# Patient Record
Sex: FEMALE | Race: WHITE | NOT HISPANIC OR LATINO | Employment: FULL TIME | URBAN - METROPOLITAN AREA
[De-identification: names, ages, dates, MRNs, and addresses within clinical notes are randomized per-mention and may not be internally consistent; named-entity substitution may affect disease eponyms.]

---

## 2023-09-08 ENCOUNTER — HOSPITAL ENCOUNTER (OUTPATIENT)
Dept: DATA CONVERSION | Facility: HOSPITAL | Age: 33
Discharge: HOME | End: 2023-09-08
Payer: COMMERCIAL

## 2023-09-08 DIAGNOSIS — L70.8 OTHER ACNE: ICD-10-CM

## 2023-09-08 LAB — POTASSIUM SERPL-SCNC: 4.7 MMOL/L (ref 3.4–5.1)

## 2023-10-17 ENCOUNTER — HOSPITAL ENCOUNTER (OUTPATIENT)
Dept: RADIOLOGY | Facility: HOSPITAL | Age: 33
Discharge: HOME | End: 2023-10-17
Payer: COMMERCIAL

## 2023-10-17 DIAGNOSIS — D39.10 NEOPLASM OF UNCERTAIN BEHAVIOR OF UNSPECIFIED OVARY: ICD-10-CM

## 2023-10-17 DIAGNOSIS — N83.2 CYST OF OVARY: ICD-10-CM

## 2023-10-17 DIAGNOSIS — N83.209 CYST OF OVARY: ICD-10-CM

## 2023-10-17 PROBLEM — R79.89 LOW VITAMIN D LEVEL: Status: ACTIVE | Noted: 2023-10-17

## 2023-10-17 PROBLEM — Z15.89 MONOALLELIC MUTATION OF CHEK2 GENE IN FEMALE PATIENT: Status: ACTIVE | Noted: 2023-10-17

## 2023-10-17 PROBLEM — Z15.02 MONOALLELIC MUTATION OF CHEK2 GENE IN FEMALE PATIENT: Status: ACTIVE | Noted: 2023-10-17

## 2023-10-17 PROBLEM — H69.92 DYSFUNCTION OF LEFT EUSTACHIAN TUBE: Status: ACTIVE | Noted: 2023-10-17

## 2023-10-17 PROBLEM — R85.610: Status: ACTIVE | Noted: 2023-10-17

## 2023-10-17 PROBLEM — Z15.09 MONOALLELIC MUTATION OF CHEK2 GENE IN FEMALE PATIENT: Status: ACTIVE | Noted: 2023-10-17

## 2023-10-17 PROBLEM — C56.9 MALIGNANT NEOPLASM OF OVARY (MULTI): Status: ACTIVE | Noted: 2023-10-17

## 2023-10-17 PROBLEM — Z15.01 MONOALLELIC MUTATION OF CHEK2 GENE IN FEMALE PATIENT: Status: ACTIVE | Noted: 2023-10-17

## 2023-10-17 PROCEDURE — 76830 TRANSVAGINAL US NON-OB: CPT

## 2023-10-17 PROCEDURE — 76830 TRANSVAGINAL US NON-OB: CPT | Performed by: RADIOLOGY

## 2023-10-17 PROCEDURE — 76856 US EXAM PELVIC COMPLETE: CPT | Performed by: RADIOLOGY

## 2023-10-17 PROCEDURE — 76856 US EXAM PELVIC COMPLETE: CPT

## 2023-10-17 RX ORDER — SPIRONOLACTONE 50 MG/1
50 TABLET, FILM COATED ORAL DAILY
COMMUNITY

## 2023-10-18 ENCOUNTER — APPOINTMENT (OUTPATIENT)
Dept: RADIOLOGY | Facility: HOSPITAL | Age: 33
End: 2023-10-18
Payer: COMMERCIAL

## 2023-10-19 ENCOUNTER — OFFICE VISIT (OUTPATIENT)
Dept: GYNECOLOGIC ONCOLOGY | Facility: CLINIC | Age: 33
End: 2023-10-19
Payer: COMMERCIAL

## 2023-10-19 VITALS
WEIGHT: 189.82 LBS | BODY MASS INDEX: 28.11 KG/M2 | DIASTOLIC BLOOD PRESSURE: 67 MMHG | SYSTOLIC BLOOD PRESSURE: 121 MMHG | HEART RATE: 71 BPM | HEIGHT: 69 IN | RESPIRATION RATE: 16 BRPM

## 2023-10-19 DIAGNOSIS — Z87.42 HX OF OVARIAN CYST: ICD-10-CM

## 2023-10-19 DIAGNOSIS — C56.9 MALIGNANT NEOPLASM OF OVARY, UNSPECIFIED LATERALITY (MULTI): Primary | ICD-10-CM

## 2023-10-19 PROCEDURE — 99214 OFFICE O/P EST MOD 30 MIN: CPT | Performed by: NURSE PRACTITIONER

## 2023-10-19 ASSESSMENT — PAIN SCALES - GENERAL: PAINLEVEL: 0-NO PAIN

## 2023-10-19 NOTE — PROGRESS NOTES
Patient ID: Lissett Jacobsen is a 32 y.o. female.  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  Same mutation found in her maternal grandmother with endometrial cancer.  Mutation felt to increase risk of breast and colon cancer by approximately twice normal rate.  Unsure if this mutation led to an increased risk of the borderline mucinous neoplasm.      OBGynHx: G0, has never had a pap smear before. States she's had regular monthly menses.   PMHx: Denies  PSHx: Tonsillectomy (age 5)  FMHx: GM with uterine cancer in her 50s, Uncle recently diagnosed with possible peritoneal cancer  Meds: Denies  All: NKDA  SH: Denies T/E/D      Family History:   Cancer: yes  GM with uterine cancer (50s), uncle with peritoneal cancer     Last pap 9/2021 was negative    Objective    Visit Vitals  /67 (BP Location: Left arm, Patient Position: Sitting, BP Cuff Size: Adult)   Pulse 71   Resp 16        Patient is a 32 year old female with CHEK-2 mutation s/p exploratory laparotomy, left salpingo-oophorectomy, left pelvic & periaortic lymph node dissection, omentectomy and appendectomy on 3/14/18 for stage 1A mucinous borderline left ovary tumor with microinvasion. Pelvic ultrasound showed resolved ovarian cyst but enlarging endometrial polyp.  Now s/p D&C 11/2022 for endometrial polyp.      Had repeat ultrasound to look at ovarian cyst.  Impression US 10/17/23:  Echogenic lesion with vascular stalk in the upper portion of the  endometrial canal suggestive of polyp. This is similar in appearance  when compared to prior exam.      Right ovary unremarkable.      Left ovary surgically absent.    Interval:  Patient not been sexually active, not using anything for birth control.  States her periods have been normal since her D&C, no intermenstrual  bleeding.  Denies any bowel or bladder issues.          Physical Exam:    Constitutional: Doing well. SUSANNA  Eyes: PERRL  ENMT: Moist mucus membranes  Head/Neck: Supple. Symmetrical  Cardiovascular: Regular, rate and rhythm. 2+ equal pulses of the extremities  Respiratory/Thorax: CTA. RRR. Chest rise symmetrical.  Gastrointestinal: Non-distended, soft, non-tender  Genitourinary: Cervical os visualized with no lesions, no CMT, small mobile uterus, no adnexal masses noted. Smooth vaginal walls.  Vulva with no lesions noted. Smooth rectovaginal septum.   Musculoskeletal: ROM intact, no joint swelling, normal strength  Extremities: No edema  Neurological: Alert and oriented x 3. Pleasant and cooperative.  Lymphatic: No lymphadenopathy. No lymphedema  Psychological: Appropriate mood and behavior  Skin: Warm and dry, no lesions, no rashes    A complete review of systems was performed and all systems were normal except what is noted in the interval history.      Assessment/Plan  Patient is a 32 year old female s/p exploratory laparotomy, left salpingo-oophorectomy, left pelvic & periaortic lymph node dissection, omentectomy and appendectomy on 3/14/18 for stage 1A mucinous borderline left ovary tumor with microinvasion. Pelvic ultrasound showed resolved ovarian cyst but enlarging endometrial polyp.  Now s/p D&C 11/2022 for endometrial polyp. SUSANNA 5 years, resolved ovarian cyst.       PLAN:    Pelvic ultrasound in 1 year or as needed  F/U in 1 year or as needed

## 2024-09-03 ENCOUNTER — TELEPHONE (OUTPATIENT)
Dept: GYNECOLOGIC ONCOLOGY | Facility: HOSPITAL | Age: 34
End: 2024-09-03
Payer: COMMERCIAL

## 2024-09-03 NOTE — TELEPHONE ENCOUNTER
Patient called requesting pelvic ultrasound order in preparation for appointment with Lissett Palencia CNP in October.   Phoned patient to notify that ultrasound order has been entered and radiology scheduling will be able to view the order when she calls to schedule.   Patient verbalized her understanding of information given.

## 2024-09-18 DIAGNOSIS — Z87.42 HX OF OVARIAN CYST: ICD-10-CM

## 2024-09-18 DIAGNOSIS — C56.9 MALIGNANT NEOPLASM OF OVARY, UNSPECIFIED LATERALITY (MULTI): Primary | ICD-10-CM

## 2024-09-19 ENCOUNTER — HOSPITAL ENCOUNTER (OUTPATIENT)
Dept: RADIOLOGY | Facility: HOSPITAL | Age: 34
Discharge: HOME | End: 2024-09-19
Payer: COMMERCIAL

## 2024-09-19 ENCOUNTER — APPOINTMENT (OUTPATIENT)
Dept: RADIOLOGY | Facility: HOSPITAL | Age: 34
End: 2024-09-19
Payer: COMMERCIAL

## 2024-09-19 DIAGNOSIS — C56.9 MALIGNANT NEOPLASM OF OVARY, UNSPECIFIED LATERALITY (MULTI): ICD-10-CM

## 2024-09-19 DIAGNOSIS — Z87.42 HX OF OVARIAN CYST: ICD-10-CM

## 2024-09-19 PROCEDURE — 76856 US EXAM PELVIC COMPLETE: CPT

## 2024-10-23 NOTE — PROGRESS NOTES
Patient ID: Lissett Jacobsen is a 33 y.o. female.  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  Same mutation found in her maternal grandmother with endometrial cancer.  Mutation felt to increase risk of breast and colon cancer by approximately twice normal rate.  Unsure if this mutation led to an increased risk of the borderline mucinous neoplasm.      OBGynHx: G0, has never had a pap smear before. States she's had regular monthly menses.   PMHx: Denies  PSHx: Tonsillectomy (age 5)  FMHx: GM with uterine cancer in her 50s, Uncle recently diagnosed with possible peritoneal cancer  Meds: Denies  All: NKDA  SH: Denies T/E/D      Family History:   Cancer: yes  GM with uterine cancer (50s), uncle with peritoneal cancer     Last pap 9/2021 was negative    Objective    Visit Vitals  /86 (BP Location: Right arm, Patient Position: Sitting, BP Cuff Size: Adult)   Pulse 88   Resp 18          Interval:  Patient is a 33 year old female with CHEK-2 mutation s/p exploratory laparotomy, left salpingo-oophorectomy, left pelvic & periaortic lymph node dissection, omentectomy and appendectomy on 3/14/18 for stage 1A mucinous borderline left ovary tumor with microinvasion. Pelvic ultrasound showed resolved ovarian cyst but enlarging endometrial polyp.  Now s/p D&C 11/2022 for endometrial polyp.      IMPRESSION:  1. Stable upper endometrial 0.7 cm lesion with a possible stalk  likely representing endometrial polyp.  2. Complex right adnexal follicle measuring 1.7 cm.  3. Prior left oophorectomy.  4. Trace nonspecific pelvic free fluid    States her periods have been normal. She has not been sexually active.  Denies any bowel or bladder issues.       Physical Exam:    Constitutional: Doing well. SUSANNA  Eyes: PERRL  ENMT: Moist mucus  membranes  Head/Neck: Supple. Symmetrical  Cardiovascular: Regular, rate and rhythm. 2+ equal pulses of the extremities  Respiratory/Thorax: CTA. RRR. Chest rise symmetrical.  Gastrointestinal: Non-distended, soft, non-tender  Genitourinary: Cervical os visualized with no lesions, no CMT, small mobile uterus, no adnexal masses noted. Menstrual blood noted in vaginal vault.  Smooth vaginal walls.  Vulva with no lesions noted.   Musculoskeletal: ROM intact, no joint swelling, normal strength  Extremities: No edema  Neurological: Alert and oriented x 3. Pleasant and cooperative.  Lymphatic: No lymphadenopathy. No lymphedema  Psychological: Appropriate mood and behavior  Skin: Warm and dry, no lesions, no rashes    A complete review of systems was performed and all systems were normal except what is noted in the interval history.      Assessment/Plan  Patient is a 33 year old female s/p exploratory laparotomy, left salpingo-oophorectomy, left pelvic & periaortic lymph node dissection, omentectomy and appendectomy on 3/14/18 for stage 1A mucinous borderline left ovary tumor with microinvasion. Pelvic ultrasound showed resolved ovarian cyst but enlarging endometrial polyp.  Now s/p D&C 11/2022 for endometrial polyp. SUSANNA 6 years.  Recurrent polyp on ultrasound but asymptomatic.        PLAN:  Pap today, F/U results  Pelvic ultrasound in 1 year and F/U after   Call if desires contraceptive management

## 2024-10-24 ENCOUNTER — APPOINTMENT (OUTPATIENT)
Dept: GYNECOLOGIC ONCOLOGY | Facility: CLINIC | Age: 34
End: 2024-10-24
Payer: COMMERCIAL

## 2024-10-24 VITALS
HEIGHT: 68 IN | WEIGHT: 194.2 LBS | SYSTOLIC BLOOD PRESSURE: 146 MMHG | HEART RATE: 88 BPM | RESPIRATION RATE: 18 BRPM | BODY MASS INDEX: 29.43 KG/M2 | DIASTOLIC BLOOD PRESSURE: 86 MMHG | OXYGEN SATURATION: 98 %

## 2024-10-24 DIAGNOSIS — C56.9 MALIGNANT NEOPLASM OF OVARY, UNSPECIFIED LATERALITY (MULTI): ICD-10-CM

## 2024-10-24 DIAGNOSIS — Z87.42 HX OF OVARIAN CYST: ICD-10-CM

## 2024-10-24 DIAGNOSIS — Z01.419 ENCOUNTER FOR WELL WOMAN EXAM: Primary | ICD-10-CM

## 2024-10-24 ASSESSMENT — PAIN SCALES - GENERAL: PAINLEVEL_OUTOF10: 0-NO PAIN

## 2024-11-11 LAB
CYTOLOGY CMNT CVX/VAG CYTO-IMP: NORMAL
HPV HR 12 DNA GENITAL QL NAA+PROBE: NEGATIVE
HPV HR GENOTYPES PNL CVX NAA+PROBE: NEGATIVE
HPV16 DNA SPEC QL NAA+PROBE: NEGATIVE
HPV18 DNA SPEC QL NAA+PROBE: NEGATIVE
LAB AP HPV GENOTYPE QUESTION: YES
LAB AP HPV HR: NORMAL
LABORATORY COMMENT REPORT: NORMAL
LABORATORY COMMENT REPORT: NORMAL
PATH REPORT.TOTAL CANCER: NORMAL

## 2024-11-12 ENCOUNTER — APPOINTMENT (OUTPATIENT)
Dept: GYNECOLOGIC ONCOLOGY | Facility: CLINIC | Age: 34
End: 2024-11-12
Payer: COMMERCIAL

## 2024-11-13 ENCOUNTER — TELEPHONE (OUTPATIENT)
Dept: GYNECOLOGIC ONCOLOGY | Facility: HOSPITAL | Age: 34
End: 2024-11-13
Payer: COMMERCIAL

## 2024-11-13 NOTE — TELEPHONE ENCOUNTER
I left a message for Lissett that her pap and HPV were both negative and her NP recommends that she follow up in one year with a pelvic ultrasound. I advised her to call the office with questions or concerns.

## 2025-10-20 ENCOUNTER — APPOINTMENT (OUTPATIENT)
Dept: RADIOLOGY | Facility: HOSPITAL | Age: 35
End: 2025-10-20
Payer: COMMERCIAL

## 2025-10-30 ENCOUNTER — APPOINTMENT (OUTPATIENT)
Dept: GYNECOLOGIC ONCOLOGY | Facility: CLINIC | Age: 35
End: 2025-10-30
Payer: COMMERCIAL